# Patient Record
Sex: FEMALE | Race: BLACK OR AFRICAN AMERICAN | NOT HISPANIC OR LATINO | Employment: FULL TIME | URBAN - METROPOLITAN AREA
[De-identification: names, ages, dates, MRNs, and addresses within clinical notes are randomized per-mention and may not be internally consistent; named-entity substitution may affect disease eponyms.]

---

## 2023-06-12 LAB
ANION GAP IN SER/PLAS: 10 MMOL/L (ref 10–20)
CALCIUM (MG/DL) IN SER/PLAS: 9.5 MG/DL (ref 8.6–10.6)
CARBON DIOXIDE, TOTAL (MMOL/L) IN SER/PLAS: 24 MMOL/L (ref 21–32)
CHLORIDE (MMOL/L) IN SER/PLAS: 110 MMOL/L (ref 98–107)
CREATININE (MG/DL) IN SER/PLAS: 0.68 MG/DL (ref 0.5–1.05)
ESTIMATED AVERAGE GLUCOSE FOR HBA1C: 148 MG/DL
GFR FEMALE: >90 ML/MIN/1.73M2
GLUCOSE (MG/DL) IN SER/PLAS: 134 MG/DL (ref 74–99)
HEMOGLOBIN A1C/HEMOGLOBIN TOTAL IN BLOOD: 6.8 %
POTASSIUM (MMOL/L) IN SER/PLAS: 4 MMOL/L (ref 3.5–5.3)
SODIUM (MMOL/L) IN SER/PLAS: 140 MMOL/L (ref 136–145)
UREA NITROGEN (MG/DL) IN SER/PLAS: 7 MG/DL (ref 6–23)

## 2023-09-30 ENCOUNTER — PHARMACY VISIT (OUTPATIENT)
Dept: PHARMACY | Facility: CLINIC | Age: 53
End: 2023-09-30
Payer: MEDICAID

## 2023-09-30 PROCEDURE — RXMED WILLOW AMBULATORY MEDICATION CHARGE

## 2023-10-16 DIAGNOSIS — E11.9 TYPE 2 DIABETES MELLITUS WITHOUT COMPLICATION, WITH LONG-TERM CURRENT USE OF INSULIN (MULTI): Primary | ICD-10-CM

## 2023-10-16 DIAGNOSIS — Z79.4 TYPE 2 DIABETES MELLITUS WITHOUT COMPLICATION, WITH LONG-TERM CURRENT USE OF INSULIN (MULTI): Primary | ICD-10-CM

## 2023-10-16 RX ORDER — INSULIN LISPRO 100 [IU]/ML
INJECTION, SOLUTION INTRAVENOUS; SUBCUTANEOUS
COMMUNITY
End: 2023-12-11 | Stop reason: SDUPTHER

## 2023-10-16 RX ORDER — INSULIN GLARGINE 100 [IU]/ML
INJECTION, SOLUTION SUBCUTANEOUS
COMMUNITY
Start: 2020-08-21 | End: 2023-10-16 | Stop reason: SDUPTHER

## 2023-10-16 RX ORDER — INSULIN ASPART 100 [IU]/ML
INJECTION, SOLUTION INTRAVENOUS; SUBCUTANEOUS
COMMUNITY
Start: 2022-01-04 | End: 2023-10-16 | Stop reason: SDUPTHER

## 2023-10-17 ENCOUNTER — PHARMACY VISIT (OUTPATIENT)
Dept: PHARMACY | Facility: CLINIC | Age: 53
End: 2023-10-17
Payer: MEDICAID

## 2023-10-17 PROCEDURE — RXMED WILLOW AMBULATORY MEDICATION CHARGE

## 2023-10-17 RX ORDER — ROSUVASTATIN CALCIUM 20 MG/1
20 TABLET, COATED ORAL DAILY
Qty: 30 TABLET | Refills: 3 | Status: SHIPPED | OUTPATIENT
Start: 2023-10-17 | End: 2024-10-16

## 2023-10-17 RX ORDER — INSULIN ASPART 100 [IU]/ML
6 INJECTION, SOLUTION INTRAVENOUS; SUBCUTANEOUS
Qty: 15 ML | Refills: 0 | Status: SHIPPED | OUTPATIENT
Start: 2023-10-17 | End: 2023-12-11 | Stop reason: SDUPTHER

## 2023-10-17 RX ORDER — INSULIN GLARGINE 100 [IU]/ML
23 INJECTION, SOLUTION SUBCUTANEOUS NIGHTLY
Qty: 15 ML | Refills: 0 | Status: SHIPPED | OUTPATIENT
Start: 2023-10-17 | End: 2023-12-11 | Stop reason: SDUPTHER

## 2023-10-18 ENCOUNTER — PHARMACY VISIT (OUTPATIENT)
Dept: PHARMACY | Facility: CLINIC | Age: 53
End: 2023-10-18
Payer: MEDICAID

## 2023-10-18 PROCEDURE — RXMED WILLOW AMBULATORY MEDICATION CHARGE

## 2023-10-23 ENCOUNTER — PHARMACY VISIT (OUTPATIENT)
Dept: PHARMACY | Facility: CLINIC | Age: 53
End: 2023-10-23
Payer: MEDICAID

## 2023-10-23 PROCEDURE — RXMED WILLOW AMBULATORY MEDICATION CHARGE

## 2023-12-05 ENCOUNTER — PHARMACY VISIT (OUTPATIENT)
Dept: PHARMACY | Facility: CLINIC | Age: 53
End: 2023-12-05
Payer: MEDICAID

## 2023-12-05 PROCEDURE — RXMED WILLOW AMBULATORY MEDICATION CHARGE

## 2023-12-11 DIAGNOSIS — E11.9 TYPE 2 DIABETES MELLITUS WITHOUT COMPLICATION, WITH LONG-TERM CURRENT USE OF INSULIN (MULTI): ICD-10-CM

## 2023-12-11 DIAGNOSIS — Z79.4 TYPE 2 DIABETES MELLITUS WITHOUT COMPLICATION, WITH LONG-TERM CURRENT USE OF INSULIN (MULTI): ICD-10-CM

## 2023-12-12 PROCEDURE — RXMED WILLOW AMBULATORY MEDICATION CHARGE

## 2023-12-12 RX ORDER — INSULIN ASPART 100 [IU]/ML
6 INJECTION, SOLUTION INTRAVENOUS; SUBCUTANEOUS
Qty: 15 ML | Refills: 0 | Status: SHIPPED | OUTPATIENT
Start: 2023-12-12 | End: 2023-12-20 | Stop reason: SDUPTHER

## 2023-12-12 RX ORDER — INSULIN GLARGINE 100 [IU]/ML
23 INJECTION, SOLUTION SUBCUTANEOUS NIGHTLY
Qty: 15 ML | Refills: 0 | Status: SHIPPED | OUTPATIENT
Start: 2023-12-12

## 2023-12-12 RX ORDER — INSULIN LISPRO 100 [IU]/ML
INJECTION, SOLUTION INTRAVENOUS; SUBCUTANEOUS
Qty: 10 ML | Refills: 0 | Status: SHIPPED | OUTPATIENT
Start: 2023-12-12

## 2023-12-20 ENCOUNTER — OFFICE VISIT (OUTPATIENT)
Dept: PRIMARY CARE | Facility: CLINIC | Age: 53
End: 2023-12-20
Payer: COMMERCIAL

## 2023-12-20 VITALS
OXYGEN SATURATION: 98 % | TEMPERATURE: 98 F | BODY MASS INDEX: 34.32 KG/M2 | RESPIRATION RATE: 18 BRPM | WEIGHT: 206 LBS | HEART RATE: 74 BPM | SYSTOLIC BLOOD PRESSURE: 156 MMHG | DIASTOLIC BLOOD PRESSURE: 87 MMHG | HEIGHT: 65 IN

## 2023-12-20 DIAGNOSIS — J02.9 SORE THROAT: ICD-10-CM

## 2023-12-20 DIAGNOSIS — E11.9 TYPE 2 DIABETES MELLITUS WITHOUT COMPLICATION, WITH LONG-TERM CURRENT USE OF INSULIN (MULTI): ICD-10-CM

## 2023-12-20 DIAGNOSIS — E04.1 THYROID NODULE: Primary | ICD-10-CM

## 2023-12-20 DIAGNOSIS — Z79.4 TYPE 2 DIABETES MELLITUS WITHOUT COMPLICATION, WITH LONG-TERM CURRENT USE OF INSULIN (MULTI): ICD-10-CM

## 2023-12-20 LAB
ANION GAP SERPL CALC-SCNC: 9 MMOL/L (ref 10–20)
BUN SERPL-MCNC: 9 MG/DL (ref 6–23)
CALCIUM SERPL-MCNC: 9.5 MG/DL (ref 8.6–10.6)
CHLORIDE SERPL-SCNC: 108 MMOL/L (ref 98–107)
CO2 SERPL-SCNC: 26 MMOL/L (ref 21–32)
CREAT SERPL-MCNC: 0.67 MG/DL (ref 0.5–1.05)
EST. AVERAGE GLUCOSE BLD GHB EST-MCNC: 189 MG/DL
GFR SERPL CREATININE-BSD FRML MDRD: >90 ML/MIN/1.73M*2
GLUCOSE SERPL-MCNC: 150 MG/DL (ref 74–99)
HBA1C MFR BLD: 8.2 %
POC RAPID STREP: NEGATIVE
POTASSIUM SERPL-SCNC: 3.8 MMOL/L (ref 3.5–5.3)
SODIUM SERPL-SCNC: 139 MMOL/L (ref 136–145)

## 2023-12-20 PROCEDURE — 3079F DIAST BP 80-89 MM HG: CPT | Performed by: NURSE PRACTITIONER

## 2023-12-20 PROCEDURE — 99213 OFFICE O/P EST LOW 20 MIN: CPT | Performed by: NURSE PRACTITIONER

## 2023-12-20 PROCEDURE — 4010F ACE/ARB THERAPY RXD/TAKEN: CPT | Performed by: NURSE PRACTITIONER

## 2023-12-20 PROCEDURE — RXMED WILLOW AMBULATORY MEDICATION CHARGE

## 2023-12-20 PROCEDURE — 83036 HEMOGLOBIN GLYCOSYLATED A1C: CPT | Performed by: NURSE PRACTITIONER

## 2023-12-20 PROCEDURE — 36415 COLL VENOUS BLD VENIPUNCTURE: CPT | Performed by: NURSE PRACTITIONER

## 2023-12-20 PROCEDURE — 3052F HG A1C>EQUAL 8.0%<EQUAL 9.0%: CPT | Performed by: NURSE PRACTITIONER

## 2023-12-20 PROCEDURE — 80048 BASIC METABOLIC PNL TOTAL CA: CPT | Performed by: NURSE PRACTITIONER

## 2023-12-20 PROCEDURE — 87880 STREP A ASSAY W/OPTIC: CPT | Performed by: NURSE PRACTITIONER

## 2023-12-20 PROCEDURE — 3077F SYST BP >= 140 MM HG: CPT | Performed by: NURSE PRACTITIONER

## 2023-12-20 RX ORDER — INSULIN ASPART 100 [IU]/ML
6 INJECTION, SOLUTION INTRAVENOUS; SUBCUTANEOUS
Qty: 15 ML | Refills: 0 | Status: SHIPPED | OUTPATIENT
Start: 2023-12-20

## 2023-12-20 RX ORDER — DULAGLUTIDE 1.5 MG/.5ML
1.5 INJECTION, SOLUTION SUBCUTANEOUS
Qty: 2 ML | Refills: 11 | Status: SHIPPED | OUTPATIENT
Start: 2023-12-20

## 2023-12-20 ASSESSMENT — PAIN SCALES - GENERAL: PAINLEVEL: 0-NO PAIN

## 2023-12-20 NOTE — PATIENT INSTRUCTIONS
Thank you for coming in for your visit today!    Please follow up in 12 weeks or sooner if needed.     Call to schedule the thyroid ultrasound.     Today we completed blood work. We will contact you with any abnormalities from this testing.    START trulicity     Call 911 or go to the emergency room if you have pain in your chest, difficulty breathing, or other life threatening symptoms.

## 2023-12-20 NOTE — PROGRESS NOTES
"Subjective   Mireille Haynes is a 52 y.o. female who presents for neck swellen.  HPI    Sent refills on prescriptions but patient has not been taking them. Did not realize they were at the pharmacy.   Notes that she went to the endocrinologist about a year ago and raised concern for her thyroid. She also notes that just on Monday she noticed generalized neck swelling and some challenge with swallowing. Swallowing has since improved. Denies throat pain.     All systems reviewed. Review of systems negative except for noted positives in HPI    Objective     /87   Pulse 74   Temp 36.7 °C (98 °F)   Resp 18   Ht 1.651 m (5' 5\")   Wt 93.4 kg (206 lb)   SpO2 98%   BMI 34.28 kg/m²    Vital signs noted and reviewed.       Physical Exam  Constitutional:       Appearance: Normal appearance.   Neck:      Thyroid: Thyromegaly present.   Cardiovascular:      Rate and Rhythm: Normal rate and regular rhythm.   Pulmonary:      Effort: Pulmonary effort is normal. No respiratory distress.      Breath sounds: Normal breath sounds.   Skin:     General: Skin is warm and dry.   Neurological:      Mental Status: She is oriented to person, place, and time.   Psychiatric:         Mood and Affect: Mood normal.             Assessment/Plan   Problem List Items Addressed This Visit    None              "

## 2023-12-22 ENCOUNTER — PHARMACY VISIT (OUTPATIENT)
Dept: PHARMACY | Facility: CLINIC | Age: 53
End: 2023-12-22
Payer: MEDICAID

## 2023-12-22 PROCEDURE — RXMED WILLOW AMBULATORY MEDICATION CHARGE

## 2023-12-27 DIAGNOSIS — Z79.4 TYPE 2 DIABETES MELLITUS WITHOUT COMPLICATION, WITH LONG-TERM CURRENT USE OF INSULIN (MULTI): ICD-10-CM

## 2023-12-27 DIAGNOSIS — E11.9 TYPE 2 DIABETES MELLITUS WITHOUT COMPLICATION, WITH LONG-TERM CURRENT USE OF INSULIN (MULTI): ICD-10-CM

## 2023-12-27 RX ORDER — LIRAGLUTIDE 6 MG/ML
INJECTION SUBCUTANEOUS
COMMUNITY
Start: 2021-02-15 | End: 2023-12-27 | Stop reason: SDUPTHER

## 2023-12-28 PROCEDURE — RXMED WILLOW AMBULATORY MEDICATION CHARGE

## 2023-12-28 RX ORDER — LIRAGLUTIDE 6 MG/ML
1.8 INJECTION SUBCUTANEOUS
Qty: 9 ML | Refills: 3 | Status: SHIPPED | OUTPATIENT
Start: 2023-12-28 | End: 2024-03-20 | Stop reason: ALTCHOICE

## 2023-12-29 ENCOUNTER — PHARMACY VISIT (OUTPATIENT)
Dept: PHARMACY | Facility: CLINIC | Age: 53
End: 2023-12-29
Payer: MEDICAID

## 2024-01-19 ENCOUNTER — PHARMACY VISIT (OUTPATIENT)
Dept: PHARMACY | Facility: CLINIC | Age: 54
End: 2024-01-19
Payer: MEDICAID

## 2024-01-22 PROCEDURE — RXMED WILLOW AMBULATORY MEDICATION CHARGE

## 2024-02-21 ENCOUNTER — PHARMACY VISIT (OUTPATIENT)
Dept: PHARMACY | Facility: CLINIC | Age: 54
End: 2024-02-21
Payer: MEDICAID

## 2024-03-13 ENCOUNTER — HOSPITAL ENCOUNTER (OUTPATIENT)
Dept: RADIOLOGY | Facility: HOSPITAL | Age: 54
Discharge: HOME | End: 2024-03-13
Payer: COMMERCIAL

## 2024-03-13 VITALS — WEIGHT: 200 LBS | BODY MASS INDEX: 34.15 KG/M2 | HEIGHT: 64 IN

## 2024-03-13 DIAGNOSIS — Z12.39 ENCOUNTER FOR OTHER SCREENING FOR MALIGNANT NEOPLASM OF BREAST: ICD-10-CM

## 2024-03-13 DIAGNOSIS — E04.1 THYROID NODULE: ICD-10-CM

## 2024-03-13 PROCEDURE — 77063 BREAST TOMOSYNTHESIS BI: CPT | Mod: BILATERAL PROCEDURE | Performed by: RADIOLOGY

## 2024-03-13 PROCEDURE — 76536 US EXAM OF HEAD AND NECK: CPT | Performed by: STUDENT IN AN ORGANIZED HEALTH CARE EDUCATION/TRAINING PROGRAM

## 2024-03-13 PROCEDURE — 77067 SCR MAMMO BI INCL CAD: CPT | Mod: BILATERAL PROCEDURE | Performed by: RADIOLOGY

## 2024-03-13 PROCEDURE — 76536 US EXAM OF HEAD AND NECK: CPT

## 2024-03-13 PROCEDURE — 77067 SCR MAMMO BI INCL CAD: CPT

## 2024-03-20 ENCOUNTER — PHARMACY VISIT (OUTPATIENT)
Dept: PHARMACY | Facility: CLINIC | Age: 54
End: 2024-03-20
Payer: MEDICAID

## 2024-03-20 PROCEDURE — RXMED WILLOW AMBULATORY MEDICATION CHARGE

## 2024-05-16 ENCOUNTER — OFFICE VISIT (OUTPATIENT)
Dept: PRIMARY CARE | Facility: CLINIC | Age: 54
End: 2024-05-16
Payer: COMMERCIAL

## 2024-05-16 VITALS
OXYGEN SATURATION: 97 % | SYSTOLIC BLOOD PRESSURE: 138 MMHG | DIASTOLIC BLOOD PRESSURE: 83 MMHG | BODY MASS INDEX: 34.45 KG/M2 | TEMPERATURE: 98.3 F | HEIGHT: 64 IN | HEART RATE: 84 BPM | RESPIRATION RATE: 16 BRPM | WEIGHT: 201.8 LBS

## 2024-05-16 DIAGNOSIS — K21.9 GASTROESOPHAGEAL REFLUX DISEASE WITHOUT ESOPHAGITIS: ICD-10-CM

## 2024-05-16 DIAGNOSIS — M79.671 RIGHT FOOT PAIN: ICD-10-CM

## 2024-05-16 DIAGNOSIS — M54.12 CERVICAL RADICULOPATHY: Primary | ICD-10-CM

## 2024-05-16 DIAGNOSIS — E04.1 THYROID NODULE: ICD-10-CM

## 2024-05-16 DIAGNOSIS — Z79.4 TYPE 2 DIABETES MELLITUS WITHOUT COMPLICATION, WITH LONG-TERM CURRENT USE OF INSULIN (MULTI): ICD-10-CM

## 2024-05-16 DIAGNOSIS — E11.9 TYPE 2 DIABETES MELLITUS WITHOUT COMPLICATION, WITH LONG-TERM CURRENT USE OF INSULIN (MULTI): ICD-10-CM

## 2024-05-16 LAB
ANION GAP SERPL CALC-SCNC: 9 MMOL/L (ref 10–20)
BUN SERPL-MCNC: 6 MG/DL (ref 6–23)
CALCIUM SERPL-MCNC: 9 MG/DL (ref 8.6–10.6)
CHLORIDE SERPL-SCNC: 105 MMOL/L (ref 98–107)
CO2 SERPL-SCNC: 28 MMOL/L (ref 21–32)
CREAT SERPL-MCNC: 0.79 MG/DL (ref 0.5–1.05)
EGFRCR SERPLBLD CKD-EPI 2021: 90 ML/MIN/1.73M*2
EST. AVERAGE GLUCOSE BLD GHB EST-MCNC: 120 MG/DL
GLUCOSE SERPL-MCNC: 124 MG/DL (ref 74–99)
HBA1C MFR BLD: 5.8 %
POTASSIUM SERPL-SCNC: 3.7 MMOL/L (ref 3.5–5.3)
SODIUM SERPL-SCNC: 138 MMOL/L (ref 136–145)

## 2024-05-16 PROCEDURE — 3075F SYST BP GE 130 - 139MM HG: CPT | Performed by: NURSE PRACTITIONER

## 2024-05-16 PROCEDURE — RXMED WILLOW AMBULATORY MEDICATION CHARGE

## 2024-05-16 PROCEDURE — 80048 BASIC METABOLIC PNL TOTAL CA: CPT | Performed by: NURSE PRACTITIONER

## 2024-05-16 PROCEDURE — 36415 COLL VENOUS BLD VENIPUNCTURE: CPT | Performed by: NURSE PRACTITIONER

## 2024-05-16 PROCEDURE — 4010F ACE/ARB THERAPY RXD/TAKEN: CPT | Performed by: NURSE PRACTITIONER

## 2024-05-16 PROCEDURE — 83036 HEMOGLOBIN GLYCOSYLATED A1C: CPT | Performed by: NURSE PRACTITIONER

## 2024-05-16 PROCEDURE — 3079F DIAST BP 80-89 MM HG: CPT | Performed by: NURSE PRACTITIONER

## 2024-05-16 PROCEDURE — 3044F HG A1C LEVEL LT 7.0%: CPT | Performed by: NURSE PRACTITIONER

## 2024-05-16 PROCEDURE — 99214 OFFICE O/P EST MOD 30 MIN: CPT | Performed by: NURSE PRACTITIONER

## 2024-05-16 RX ORDER — BLOOD-GLUCOSE,RECEIVER,CONT
EACH MISCELLANEOUS
Qty: 1 EACH | Refills: 0 | Status: SHIPPED | OUTPATIENT
Start: 2024-05-16

## 2024-05-16 RX ORDER — BLOOD-GLUCOSE SENSOR
1 EACH MISCELLANEOUS CONTINUOUS
Qty: 3 EACH | Refills: 11 | Status: SHIPPED | OUTPATIENT
Start: 2024-05-16

## 2024-05-16 RX ORDER — OMEPRAZOLE 20 MG/1
20 CAPSULE, DELAYED RELEASE ORAL DAILY
Qty: 90 CAPSULE | Refills: 3 | Status: SHIPPED | OUTPATIENT
Start: 2024-05-16

## 2024-05-16 ASSESSMENT — PAIN SCALES - GENERAL: PAINLEVEL: 5

## 2024-05-16 ASSESSMENT — ENCOUNTER SYMPTOMS
DEPRESSION: 0
LOSS OF SENSATION IN FEET: 0
OCCASIONAL FEELINGS OF UNSTEADINESS: 0

## 2024-05-16 ASSESSMENT — PATIENT HEALTH QUESTIONNAIRE - PHQ9: 2. FEELING DOWN, DEPRESSED OR HOPELESS: NOT AT ALL

## 2024-05-16 NOTE — PATIENT INSTRUCTIONS
Thank you for coming in for your visit today!    Please follow up in 3-4 months for next A1C check.     Today we completed blood work. We will contact you with any abnormalities from this testing.    For your blood pressure:  Take your medication as directed. Try to take it around the same time daily.   Keep a log of your blood pressure. Be sure to bring it with you to your next appointment so we can review it together.  Adhere to the DASH diet. This includes decreasing your salt/sodium intake. Avoid canned foods, lunch meats, and frozen foods.  Exercise for 30 minutes daily.    Use the Dexcom monitor to check your sugar.   Continue to self monitor your blood sugar and take your medications, as directed.   Decrease refined sugars and carbohydrates in your diet.   DO NOT SKIP MEALS! It is important for your blood sugar to have small healthy meals throughout the day.  Make sure to keep a snack on your person at all times, if needed, for low blood sugar.  Exercise for 30 minutes daily.    Drink adequate liquids before, during, and after exercise to prevent dehydration, which can alter blood sugar levels.      Call 911 or go to the emergency room if you have pain in your chest, difficulty breathing, or other life threatening symptoms.

## 2024-05-16 NOTE — PROGRESS NOTES
"Subjective   Mireille Haynes is a 53 y.o. female who presents for No chief complaint on file..  HPI  Ms. Haynes is here today for follow up. Notes that she has not yet seen ENT for follow up on thyroid nodules. Will schedule today.     Has not been taking diabetic medications consistently.   Notes that she has been inconsistent with Trulicity but did recently take an injection.   She notes that she lost her glucometer and then has not been using her insulin.   Denies polydipsia, polyuria, headache,  or lightheadedness.   She will feel like her blood sugar is high and notice blurred vision    GERD symptoms more recently. Has not been taking medications. Notes specific triggers and that it worsens at nighttime. Denies vomiting.     Patient also notes pain in her upper back with numb and tingling sensations down her arms. Can worsen after sleeping or after moving throughout the day. Feels like it restricts her range of motion.     All systems reviewed. Review of systems negative except for noted positives in HPI    Objective     /83 (BP Location: Left arm, Patient Position: Sitting, BP Cuff Size: Large adult)   Pulse 84   Temp 36.8 °C (98.3 °F) (Skin)   Resp 16   Ht 1.626 m (5' 4\")   Wt 91.5 kg (201 lb 12.8 oz)   SpO2 97%   BMI 34.64 kg/m²    Vital signs noted and reviewed.       Physical Exam  Constitutional:       Appearance: Normal appearance.   Cardiovascular:      Rate and Rhythm: Normal rate and regular rhythm.   Pulmonary:      Effort: Pulmonary effort is normal. No respiratory distress.      Breath sounds: Normal breath sounds.   Skin:     General: Skin is warm and dry.   Neurological:      Mental Status: She is oriented to person, place, and time.   Psychiatric:         Mood and Affect: Mood normal.             Assessment/Plan   Problem List Items Addressed This Visit    None  Visit Diagnoses       Cervical radiculopathy    -  Primary    Relevant Orders    Referral to [facility]    Referral " to [facility]    Referral to Physical Therapy    Thyroid nodule        Relevant Orders    Referral to ENT    Type 2 diabetes mellitus without complication, with long-term current use of insulin (Multi)        Relevant Medications    blood-glucose sensor (Dexcom G7 Sensor) device    Dexcom G7  misc    Other Relevant Orders    Hemoglobin A1C (Completed)    Basic Metabolic Panel (Completed)    Gastroesophageal reflux disease without esophagitis        Relevant Medications    omeprazole (PriLOSEC) 20 mg DR capsule    Right foot pain        Relevant Orders    Referral to Podiatry

## 2024-05-17 ENCOUNTER — PHARMACY VISIT (OUTPATIENT)
Dept: PHARMACY | Facility: CLINIC | Age: 54
End: 2024-05-17
Payer: MEDICAID

## 2024-05-30 ENCOUNTER — APPOINTMENT (OUTPATIENT)
Dept: PODIATRY | Facility: CLINIC | Age: 54
End: 2024-05-30
Payer: COMMERCIAL

## 2024-06-11 ENCOUNTER — PHARMACY VISIT (OUTPATIENT)
Dept: PHARMACY | Facility: CLINIC | Age: 54
End: 2024-06-11
Payer: MEDICAID

## 2024-06-11 PROCEDURE — RXMED WILLOW AMBULATORY MEDICATION CHARGE

## 2024-07-17 ENCOUNTER — APPOINTMENT (OUTPATIENT)
Dept: OTOLARYNGOLOGY | Facility: CLINIC | Age: 54
End: 2024-07-17
Payer: COMMERCIAL

## 2024-07-29 PROCEDURE — RXMED WILLOW AMBULATORY MEDICATION CHARGE

## 2024-07-31 ENCOUNTER — PHARMACY VISIT (OUTPATIENT)
Dept: PHARMACY | Facility: CLINIC | Age: 54
End: 2024-07-31
Payer: MEDICAID

## 2024-08-27 ENCOUNTER — APPOINTMENT (OUTPATIENT)
Dept: PODIATRY | Facility: CLINIC | Age: 54
End: 2024-08-27
Payer: COMMERCIAL

## 2024-09-16 ENCOUNTER — PHARMACY VISIT (OUTPATIENT)
Dept: PHARMACY | Facility: CLINIC | Age: 54
End: 2024-09-16
Payer: MEDICAID

## 2024-09-16 PROCEDURE — RXMED WILLOW AMBULATORY MEDICATION CHARGE

## 2024-10-21 ENCOUNTER — PHARMACY VISIT (OUTPATIENT)
Dept: PHARMACY | Facility: CLINIC | Age: 54
End: 2024-10-21
Payer: MEDICAID

## 2024-10-21 PROCEDURE — RXMED WILLOW AMBULATORY MEDICATION CHARGE

## 2024-10-24 ENCOUNTER — APPOINTMENT (OUTPATIENT)
Dept: PODIATRY | Facility: CLINIC | Age: 54
End: 2024-10-24
Payer: COMMERCIAL

## 2024-11-12 PROCEDURE — RXMED WILLOW AMBULATORY MEDICATION CHARGE

## 2024-11-18 PROCEDURE — RXMED WILLOW AMBULATORY MEDICATION CHARGE

## 2024-11-22 ENCOUNTER — OFFICE VISIT (OUTPATIENT)
Dept: OBSTETRICS AND GYNECOLOGY | Facility: CLINIC | Age: 54
End: 2024-11-22
Payer: COMMERCIAL

## 2024-11-22 VITALS
SYSTOLIC BLOOD PRESSURE: 173 MMHG | WEIGHT: 197.3 LBS | BODY MASS INDEX: 33.87 KG/M2 | HEART RATE: 69 BPM | DIASTOLIC BLOOD PRESSURE: 92 MMHG

## 2024-11-22 DIAGNOSIS — I10 ESSENTIAL HYPERTENSION: ICD-10-CM

## 2024-11-22 DIAGNOSIS — Z11.3 SCREENING EXAMINATION FOR STI: ICD-10-CM

## 2024-11-22 DIAGNOSIS — Z30.013 ENCOUNTER FOR INITIAL PRESCRIPTION OF INJECTABLE CONTRACEPTIVE: Primary | ICD-10-CM

## 2024-11-22 PROCEDURE — 3077F SYST BP >= 140 MM HG: CPT | Performed by: STUDENT IN AN ORGANIZED HEALTH CARE EDUCATION/TRAINING PROGRAM

## 2024-11-22 PROCEDURE — 99214 OFFICE O/P EST MOD 30 MIN: CPT | Performed by: STUDENT IN AN ORGANIZED HEALTH CARE EDUCATION/TRAINING PROGRAM

## 2024-11-22 PROCEDURE — RXMED WILLOW AMBULATORY MEDICATION CHARGE

## 2024-11-22 PROCEDURE — 96372 THER/PROPH/DIAG INJ SC/IM: CPT | Performed by: STUDENT IN AN ORGANIZED HEALTH CARE EDUCATION/TRAINING PROGRAM

## 2024-11-22 PROCEDURE — 2500000004 HC RX 250 GENERAL PHARMACY W/ HCPCS (ALT 636 FOR OP/ED): Mod: SE | Performed by: STUDENT IN AN ORGANIZED HEALTH CARE EDUCATION/TRAINING PROGRAM

## 2024-11-22 PROCEDURE — 3080F DIAST BP >= 90 MM HG: CPT | Performed by: STUDENT IN AN ORGANIZED HEALTH CARE EDUCATION/TRAINING PROGRAM

## 2024-11-22 RX ORDER — MEDROXYPROGESTERONE ACETATE 150 MG/ML
150 INJECTION, SUSPENSION INTRAMUSCULAR
Status: SHIPPED | OUTPATIENT
Start: 2024-11-22 | End: 2025-11-17

## 2024-11-22 RX ORDER — LOSARTAN POTASSIUM 100 MG/1
TABLET ORAL
Qty: 60 TABLET | Refills: 0 | Status: SHIPPED | OUTPATIENT
Start: 2024-11-22 | End: 2025-11-22

## 2024-11-22 ASSESSMENT — ENCOUNTER SYMPTOMS
ENDOCRINE NEGATIVE: 0
EYES NEGATIVE: 0
RESPIRATORY NEGATIVE: 0
ALLERGIC/IMMUNOLOGIC NEGATIVE: 0
CONSTITUTIONAL NEGATIVE: 0
CARDIOVASCULAR NEGATIVE: 0
GASTROINTESTINAL NEGATIVE: 0
PSYCHIATRIC NEGATIVE: 0
HEMATOLOGIC/LYMPHATIC NEGATIVE: 0
MUSCULOSKELETAL NEGATIVE: 0
NEUROLOGICAL NEGATIVE: 0

## 2024-11-22 ASSESSMENT — PAIN SCALES - GENERAL: PAINLEVEL_OUTOF10: 7

## 2024-11-22 NOTE — PROGRESS NOTES
Patient here for Depo injection.  Last Annual: 11/22/24  RN discussed Depo-Provera side effects, calcium.  Depo given IM into right deltoid region. Depo supplied by office/Patient brought her own depo. Patient tolerated well.  Patient to RTC between 02/7-02/21 for depo.  Patient verbalized understanding and all questions were answered.

## 2024-11-22 NOTE — ASSESSMENT & PLAN NOTE
On record review, intermittently taking medication - asymptomatic and hypertensive today  60 day courtesy refill provided  Encouraged patient to schedule follow up with primary care, Dr. Wade - states she will reach out

## 2024-11-22 NOTE — PROGRESS NOTES
Subjective   Patient ID: Mireille Haynes is a 53 y.o. female who presents for Contraception (Pt here for depo restart /Lmp-11-10-24/Last pap-9-14-20/Std-all blood,urine/Chaperone-accepted).    Here for Depo for menstrual suppression. Has been using for years and is happy with it. Also desires STI testing.     BP on arrival elevated, patient reports she forgot to take her medication this morning and smoked a cigarette while rushing here. Denies HA, CP, SOB, vision changes.    Objective   Physical Exam  Constitutional:       General: She is not in acute distress.     Appearance: Normal appearance.   HENT:      Head: Normocephalic.   Cardiovascular:      Rate and Rhythm: Normal rate.   Pulmonary:      Effort: Pulmonary effort is normal. No respiratory distress.   Skin:     General: Skin is warm and dry.   Neurological:      Mental Status: She is alert and oriented to person, place, and time.   Psychiatric:         Mood and Affect: Mood normal.         Behavior: Behavior normal.         Thought Content: Thought content normal.         Judgment: Judgment normal.         Assessment/Plan   Problem List Items Addressed This Visit             ICD-10-CM    Encounter for initial prescription of injectable contraceptive - Primary Z30.013     Missed urine cup in bathroom today - unable to provide urine  Reviewed risks of depo on an unrecognized pregnancy, and discussed extremely low likelihood of spontaneous pregnancy at age 53. Through shared decision making, patient wishes to receive depo today.  1 year depo Rx ordered, administered  Pap UTD RTC annual         Relevant Medications    medroxyPROGESTERone (Depo-Provera) injection 150 mg (Start on 11/22/2024  3:45 PM)    Other Relevant Orders    POCT pregnancy, urine    Essential hypertension I10     On record review, intermittently taking medication - asymptomatic and hypertensive today  60 day courtesy refill provided  Encouraged patient to schedule follow up with primary care,  Dr. Wade - states she will reach out         Relevant Medications    losartan (Cozaar) 100 mg tablet     Other Visit Diagnoses         Codes    Screening examination for STI     Z11.3    Relevant Orders    Syphilis Screen with Reflex    HIV 1/2 Antigen/Antibody Screen with Reflex to Confirmation    Hepatitis C Antibody    Hepatitis B Surface Antigen    Trichomonas vaginalis, Amplified    C. trachomatis / N. gonorrhoeae, Amplified                 Aron Concepcion MD 11/22/24 3:27 PM

## 2024-11-22 NOTE — ASSESSMENT & PLAN NOTE
Missed urine cup in bathroom today - unable to provide urine  Reviewed risks of depo on an unrecognized pregnancy, and discussed extremely low likelihood of spontaneous pregnancy at age 53. Through shared decision making, patient wishes to receive depo today.  1 year depo Rx ordered, administered  Pap UTD RTC annual

## 2024-12-06 ENCOUNTER — PHARMACY VISIT (OUTPATIENT)
Dept: PHARMACY | Facility: CLINIC | Age: 54
End: 2024-12-06
Payer: MEDICAID

## 2024-12-27 DIAGNOSIS — E11.9 TYPE 2 DIABETES MELLITUS WITHOUT COMPLICATION, WITH LONG-TERM CURRENT USE OF INSULIN (MULTI): ICD-10-CM

## 2024-12-27 DIAGNOSIS — Z79.4 TYPE 2 DIABETES MELLITUS WITHOUT COMPLICATION, WITH LONG-TERM CURRENT USE OF INSULIN (MULTI): ICD-10-CM

## 2024-12-27 RX ORDER — DULAGLUTIDE 1.5 MG/.5ML
1.5 INJECTION, SOLUTION SUBCUTANEOUS
Qty: 2 ML | Refills: 11 | Status: CANCELLED | OUTPATIENT
Start: 2024-12-29

## 2024-12-30 DIAGNOSIS — Z79.4 TYPE 2 DIABETES MELLITUS WITHOUT COMPLICATION, WITH LONG-TERM CURRENT USE OF INSULIN (MULTI): ICD-10-CM

## 2024-12-30 DIAGNOSIS — E11.9 TYPE 2 DIABETES MELLITUS WITHOUT COMPLICATION, WITH LONG-TERM CURRENT USE OF INSULIN (MULTI): ICD-10-CM

## 2025-01-06 RX ORDER — DULAGLUTIDE 1.5 MG/.5ML
1.5 INJECTION, SOLUTION SUBCUTANEOUS
Qty: 2 ML | Refills: 11 | Status: SHIPPED | OUTPATIENT
Start: 2025-01-12

## 2025-02-21 ENCOUNTER — CLINICAL SUPPORT (OUTPATIENT)
Dept: OBSTETRICS AND GYNECOLOGY | Facility: CLINIC | Age: 55
End: 2025-02-21
Payer: COMMERCIAL

## 2025-02-21 DIAGNOSIS — Z30.42 DEPO-PROVERA CONTRACEPTIVE STATUS: Primary | ICD-10-CM

## 2025-02-21 PROCEDURE — 2500000004 HC RX 250 GENERAL PHARMACY W/ HCPCS (ALT 636 FOR OP/ED): Mod: SE | Performed by: STUDENT IN AN ORGANIZED HEALTH CARE EDUCATION/TRAINING PROGRAM

## 2025-02-21 PROCEDURE — 96372 THER/PROPH/DIAG INJ SC/IM: CPT | Performed by: STUDENT IN AN ORGANIZED HEALTH CARE EDUCATION/TRAINING PROGRAM

## 2025-02-21 RX ADMIN — MEDROXYPROGESTERONE ACETATE 150 MG: 150 INJECTION, SUSPENSION INTRAMUSCULAR at 08:42

## 2025-02-21 NOTE — PROGRESS NOTES
Pt here for depo provera.  Last injection 11/12/214      Depo provera  supplied by office.  Injection given, Patient tolerated well. Education provided.    Annual due 11/12/2025    RTC 5/9 5/30/25  Patient verbalized  understanding and questions answered.    Dicussed side effects discussed including irregular menstrual periods or none at all, need for calcium in diet to help bone density.

## 2025-04-03 ENCOUNTER — PHARMACY VISIT (OUTPATIENT)
Dept: PHARMACY | Facility: CLINIC | Age: 55
End: 2025-04-03
Payer: MEDICAID

## 2025-04-03 PROCEDURE — RXMED WILLOW AMBULATORY MEDICATION CHARGE

## 2025-04-24 ENCOUNTER — HOSPITAL ENCOUNTER (OUTPATIENT)
Dept: RADIOLOGY | Facility: CLINIC | Age: 55
Discharge: HOME | End: 2025-04-24
Payer: COMMERCIAL

## 2025-04-24 ENCOUNTER — OFFICE VISIT (OUTPATIENT)
Dept: PRIMARY CARE | Facility: CLINIC | Age: 55
End: 2025-04-24
Payer: COMMERCIAL

## 2025-04-24 VITALS
OXYGEN SATURATION: 99 % | TEMPERATURE: 97.3 F | DIASTOLIC BLOOD PRESSURE: 75 MMHG | WEIGHT: 198 LBS | HEART RATE: 85 BPM | BODY MASS INDEX: 33.8 KG/M2 | SYSTOLIC BLOOD PRESSURE: 121 MMHG | HEIGHT: 64 IN | RESPIRATION RATE: 16 BRPM

## 2025-04-24 DIAGNOSIS — M54.12 CERVICAL RADICULOPATHY: ICD-10-CM

## 2025-04-24 DIAGNOSIS — Z79.4 TYPE 2 DIABETES MELLITUS WITH HYPERGLYCEMIA, WITH LONG-TERM CURRENT USE OF INSULIN: ICD-10-CM

## 2025-04-24 DIAGNOSIS — Z79.4 TYPE 2 DIABETES MELLITUS WITHOUT COMPLICATION, WITH LONG-TERM CURRENT USE OF INSULIN: ICD-10-CM

## 2025-04-24 DIAGNOSIS — R51.9 INTRACTABLE HEADACHE, UNSPECIFIED CHRONICITY PATTERN, UNSPECIFIED HEADACHE TYPE: ICD-10-CM

## 2025-04-24 DIAGNOSIS — R35.89 POLYURIA: Primary | ICD-10-CM

## 2025-04-24 DIAGNOSIS — L02.92 BOIL: ICD-10-CM

## 2025-04-24 DIAGNOSIS — E11.65 TYPE 2 DIABETES MELLITUS WITH HYPERGLYCEMIA, WITH LONG-TERM CURRENT USE OF INSULIN: ICD-10-CM

## 2025-04-24 DIAGNOSIS — I10 ESSENTIAL HYPERTENSION: ICD-10-CM

## 2025-04-24 DIAGNOSIS — R63.1 INCREASED THIRST: ICD-10-CM

## 2025-04-24 DIAGNOSIS — E04.1 THYROID NODULE: ICD-10-CM

## 2025-04-24 DIAGNOSIS — E11.9 TYPE 2 DIABETES MELLITUS WITHOUT COMPLICATION, WITH LONG-TERM CURRENT USE OF INSULIN: ICD-10-CM

## 2025-04-24 DIAGNOSIS — N76.0 ACUTE VAGINITIS: ICD-10-CM

## 2025-04-24 LAB
POC APPEARANCE, URINE: CLEAR
POC BILIRUBIN, URINE: NEGATIVE
POC BLOOD, URINE: NEGATIVE
POC COLOR, URINE: YELLOW
POC GLUCOSE, URINE: NEGATIVE MG/DL
POC KETONES, URINE: NEGATIVE MG/DL
POC LEUKOCYTES, URINE: NEGATIVE
POC NITRITE,URINE: NEGATIVE
POC PH, URINE: 6.5 PH
POC PROTEIN, URINE: NEGATIVE MG/DL
POC SPECIFIC GRAVITY, URINE: >=1.03
POC UROBILINOGEN, URINE: 0.2 EU/DL

## 2025-04-24 PROCEDURE — 99215 OFFICE O/P EST HI 40 MIN: CPT | Performed by: NURSE PRACTITIONER

## 2025-04-24 PROCEDURE — 4010F ACE/ARB THERAPY RXD/TAKEN: CPT | Performed by: NURSE PRACTITIONER

## 2025-04-24 PROCEDURE — 3074F SYST BP LT 130 MM HG: CPT | Performed by: NURSE PRACTITIONER

## 2025-04-24 PROCEDURE — 36415 COLL VENOUS BLD VENIPUNCTURE: CPT | Performed by: NURSE PRACTITIONER

## 2025-04-24 PROCEDURE — 3008F BODY MASS INDEX DOCD: CPT | Performed by: NURSE PRACTITIONER

## 2025-04-24 PROCEDURE — 3078F DIAST BP <80 MM HG: CPT | Performed by: NURSE PRACTITIONER

## 2025-04-24 PROCEDURE — 72050 X-RAY EXAM NECK SPINE 4/5VWS: CPT

## 2025-04-24 PROCEDURE — RXMED WILLOW AMBULATORY MEDICATION CHARGE

## 2025-04-24 PROCEDURE — 81002 URINALYSIS NONAUTO W/O SCOPE: CPT | Performed by: NURSE PRACTITIONER

## 2025-04-24 RX ORDER — NAPROXEN 500 MG/1
500 TABLET ORAL 2 TIMES DAILY PRN
Qty: 60 TABLET | Refills: 0 | Status: SHIPPED | OUTPATIENT
Start: 2025-04-24 | End: 2025-07-23

## 2025-04-24 RX ORDER — BLOOD-GLUCOSE SENSOR
1 EACH MISCELLANEOUS CONTINUOUS
Qty: 3 EACH | Refills: 11 | Status: SHIPPED | OUTPATIENT
Start: 2025-04-24

## 2025-04-24 RX ORDER — FLUCONAZOLE 150 MG/1
150 TABLET ORAL ONCE
Qty: 1 TABLET | Refills: 0 | Status: SHIPPED | OUTPATIENT
Start: 2025-04-24 | End: 2025-04-26

## 2025-04-24 RX ORDER — ROSUVASTATIN CALCIUM 20 MG/1
20 TABLET, COATED ORAL DAILY
Qty: 90 TABLET | Refills: 1 | Status: SHIPPED | OUTPATIENT
Start: 2025-04-24 | End: 2026-04-24

## 2025-04-24 RX ORDER — LOSARTAN POTASSIUM 100 MG/1
100 TABLET ORAL DAILY
Qty: 90 TABLET | Refills: 1 | Status: SHIPPED | OUTPATIENT
Start: 2025-04-24

## 2025-04-24 RX ORDER — DOXYCYCLINE 100 MG/1
100 CAPSULE ORAL 2 TIMES DAILY
Qty: 14 CAPSULE | Refills: 0 | Status: SHIPPED | OUTPATIENT
Start: 2025-04-24 | End: 2025-05-02

## 2025-04-24 RX ORDER — DICLOFENAC SODIUM 10 MG/G
4 GEL TOPICAL 4 TIMES DAILY
Qty: 100 G | Refills: 1 | Status: SHIPPED | OUTPATIENT
Start: 2025-04-24

## 2025-04-24 RX ORDER — CYCLOBENZAPRINE HCL 5 MG
5 TABLET ORAL NIGHTLY PRN
Qty: 30 TABLET | Refills: 0 | Status: SHIPPED | OUTPATIENT
Start: 2025-04-24 | End: 2025-06-23

## 2025-04-24 ASSESSMENT — ENCOUNTER SYMPTOMS
NUMBNESS: 1
FREQUENCY: 1
POLYDIPSIA: 1
OCCASIONAL FEELINGS OF UNSTEADINESS: 0
WEAKNESS: 1
LOSS OF SENSATION IN FEET: 0
DEPRESSION: 0

## 2025-04-24 ASSESSMENT — PATIENT HEALTH QUESTIONNAIRE - PHQ9
1. LITTLE INTEREST OR PLEASURE IN DOING THINGS: NOT AT ALL
SUM OF ALL RESPONSES TO PHQ9 QUESTIONS 1 AND 2: 0
2. FEELING DOWN, DEPRESSED OR HOPELESS: NOT AT ALL

## 2025-04-24 ASSESSMENT — PAIN SCALES - GENERAL: PAINLEVEL_OUTOF10: 7

## 2025-04-24 NOTE — PROGRESS NOTES
"Subjective   Patient ID: Mireille Haynes is a 54 y.o. female who presents for Med Refill.    Med Refill  Associated symptoms include numbness (Right upper arm) and weakness.    Pt. Arrives to clinic today for chief complaint of unspecified Right arm pain 7/10.  Intermittent numbness after use, and numbness and weakness in right upper arm, and 4th and 5th fingers on right hand.  Reports dropping objects at home due to weakness.  States that she has had 2 episodes of falls at home a few months ago, denies LOC, or injury, states that \"ankles just gave out\".  States that she has NOT been monitoring blood glucose at home, and takes Novolog intermittently when she feels her blood sugar is very high, when vision is blurry.  Reports polydipsia, polyuria, and intermittent blurred vision, and has concern for yeast infection.  Pt. Also states that she has a boil under right breast for 3 days, she has taken OTC triple antibiotic ointment which has helped but is . Pt. States that she has did not go to ENT referral and that thyroid nodules are still present.  Smokes 6 cigarettes a day is interested in cessation, smokes Marijuana twice daily, no ETOH.    Review of Systems   Eyes:         Intermittent blurred vision   Endocrine: Positive for polydipsia and polyuria.   Genitourinary:  Positive for frequency.   Neurological:  Positive for weakness and numbness (Right upper arm).        Intermittent numbness in right upper arm, and weakness in right hand.       Objective   /75   Pulse 85   Temp 36.3 °C (97.3 °F)   Resp 16   Ht 1.626 m (5' 4\")   Wt 89.8 kg (198 lb)   SpO2 99%   BMI 33.99 kg/m²     Physical Exam  Cardiovascular:      Rate and Rhythm: Normal rate and regular rhythm.   Pulmonary:      Effort: Pulmonary effort is normal.      Breath sounds: Normal breath sounds.   Musculoskeletal:      Right upper arm: Tenderness present.      Left upper arm: Normal.      Right elbow: No tenderness.      Right hand: " Decreased strength. Normal sensation.      Left hand: Normal.      Comments: -tenderness elicited with Abduction of  right upper arm  - strength is 5/5 bilaterally, but 4/5 in 4th and 5th finger of right hand         Assessment/Plan   Diagnoses and all orders for this visit:  Polyuria  Increased thirst  Cervical radiculopathy  -     XR cervical spine complete 4-5 views; Future  -     cyclobenzaprine (Flexeril) 5 mg tablet; Take 1 tablet (5 mg) by mouth as needed at bedtime for muscle spasms.  -     naproxen (Naprosyn) 500 mg tablet; Take 1 tablet (500 mg) by mouth 2 times a day as needed for mild pain (1 - 3) (pain).  -     diclofenac sodium (Voltaren) 1 % gel; Apply 4.5 inches (4 g) topically 4 times a day.  Intractable headache, unspecified chronicity pattern, unspecified headache type  Type 2 diabetes mellitus with hyperglycemia, with long-term current use of insulin  -     Comprehensive metabolic panel  -     Hemoglobin A1C  -     CBC  -     POCT UA (nonautomated) manually resulted  Boil  -     doxycycline (Vibramycin) 100 mg capsule; Take 1 capsule (100 mg) by mouth 2 times a day for 7 days. Take with at least 8 ounces (large glass) of water, do not lie down for 30 minutes after  Acute vaginitis  -     fluconazole (Diflucan) 150 mg tablet; Take 1 tablet (150 mg) by mouth 1 time for 1 dose.  Thyroid nodule  -     Referral to ENT; Future  Type 2 diabetes mellitus without complication, with long-term current use of insulin  -     Referral to Ophthalmology; Future  -     Referral to Podiatry; Future  -     blood-glucose sensor (Dexcom G7 Sensor) device; 1 each continuously. Change sensor every 10 days as directed  -     dulaglutide (Trulicity) 3 mg/0.5 mL injection; Inject 3 mg under the skin 1 (one) time per week.  -     rosuvastatin (Crestor) 20 mg tablet; TAKE 1 TABLET BY MOUTH ONCE DAILY AS DIRECTED  Essential hypertension  -     losartan (Cozaar) 100 mg tablet; Take 1 tablet (100 mg) by mouth once  daily.       I was present with the NP student who participated in the documentation of this note. I have personally seen and examined the patient and performed the medical decision-making components. I have reviewed the student documentation and verified the findings in the note as written with additions or exceptions as stated in the body of the note     Krystal FOWLER CNP

## 2025-04-24 NOTE — PATIENT INSTRUCTIONS
Thank you for coming in for your visit today!    Please follow up in 3 weeks or sooner if needed.     Continue to self monitor your blood sugar and take your medications, as directed.   Decrease refined sugars and carbohydrates in your diet.   DO NOT SKIP MEALS! It is important for your blood sugar to have small healthy meals throughout the day.  Make sure to keep a snack on your person at all times, if needed, for low blood sugar.  Exercise for 30 minutes daily.    Drink adequate liquids before, during, and after exercise to prevent dehydration, which can alter blood sugar levels.    Today we completed blood work. We will contact you with any abnormalities from this testing.    Call to schedule follow up with ENT [phone]    Schedule with ophthalmology and podiatry.     Drink lots of water while taking the antibiotic.  Eat yogurt with active cultures or take a probiotic.   Take the medication with food.  Take ALL of this medication even if you are feeling better.     Take fluconazole when antibiotic is completed.       Call 151 or go to the emergency room if you have pain in your chest, difficulty breathing, or other life threatening symptoms.

## 2025-04-24 NOTE — PROGRESS NOTES
"Subjective   Mireille Haynes is a 54 y.o. female who presents for Med Refill.  HPI      All systems reviewed. Review of systems negative except for noted positives in HPI    Objective     /75   Pulse 85   Temp 36.3 °C (97.3 °F)   Resp 16   Ht 1.626 m (5' 4\")   Wt 89.8 kg (198 lb)   SpO2 99%   BMI 33.99 kg/m²    Vital signs noted and reviewed.       Physical Exam        Assessment/Plan   Problem List Items Addressed This Visit    None              "

## 2025-04-25 ENCOUNTER — PHARMACY VISIT (OUTPATIENT)
Dept: PHARMACY | Facility: CLINIC | Age: 55
End: 2025-04-25
Payer: MEDICAID

## 2025-04-26 LAB
ALBUMIN SERPL-MCNC: 4.4 G/DL (ref 3.6–5.1)
ALP SERPL-CCNC: 79 U/L (ref 37–153)
ALT SERPL-CCNC: 13 U/L (ref 6–29)
ANION GAP SERPL CALCULATED.4IONS-SCNC: 8 MMOL/L (CALC) (ref 7–17)
AST SERPL-CCNC: 13 U/L (ref 10–35)
BILIRUB SERPL-MCNC: 0.8 MG/DL (ref 0.2–1.2)
BUN SERPL-MCNC: 8 MG/DL (ref 7–25)
CALCIUM SERPL-MCNC: 9.3 MG/DL (ref 8.6–10.4)
CHLORIDE SERPL-SCNC: 102 MMOL/L (ref 98–110)
CO2 SERPL-SCNC: 22 MMOL/L (ref 20–32)
CREAT SERPL-MCNC: 0.72 MG/DL (ref 0.5–1.03)
EGFRCR SERPLBLD CKD-EPI 2021: 100 ML/MIN/1.73M2
ERYTHROCYTE [DISTWIDTH] IN BLOOD BY AUTOMATED COUNT: 13 % (ref 11–15)
EST. AVERAGE GLUCOSE BLD GHB EST-MCNC: 278 MG/DL
EST. AVERAGE GLUCOSE BLD GHB EST-SCNC: 15.4 MMOL/L
GLUCOSE SERPL-MCNC: 440 MG/DL (ref 65–99)
HBA1C MFR BLD: 11.3 %
HCT VFR BLD AUTO: 45.3 % (ref 35–45)
HGB BLD-MCNC: 14.5 G/DL (ref 11.7–15.5)
MCH RBC QN AUTO: 28.7 PG (ref 27–33)
MCHC RBC AUTO-ENTMCNC: 32 G/DL (ref 32–36)
MCV RBC AUTO: 89.7 FL (ref 80–100)
PLATELET # BLD AUTO: 138 THOUSAND/UL (ref 140–400)
PMV BLD REES-ECKER: 12.9 FL (ref 7.5–12.5)
POTASSIUM SERPL-SCNC: 4.4 MMOL/L (ref 3.5–5.3)
PROT SERPL-MCNC: 7.3 G/DL (ref 6.1–8.1)
RBC # BLD AUTO: 5.05 MILLION/UL (ref 3.8–5.1)
SODIUM SERPL-SCNC: 132 MMOL/L (ref 135–146)
WBC # BLD AUTO: 9.3 THOUSAND/UL (ref 3.8–10.8)

## 2025-04-28 PROBLEM — E04.1 THYROID NODULE: Status: ACTIVE | Noted: 2025-04-28

## 2025-04-28 PROBLEM — Z30.013 ENCOUNTER FOR INITIAL PRESCRIPTION OF INJECTABLE CONTRACEPTIVE: Status: RESOLVED | Noted: 2024-11-22 | Resolved: 2025-04-28

## 2025-04-28 PROBLEM — E04.1 THYROID NODULE: Status: RESOLVED | Noted: 2025-04-28 | Resolved: 2025-04-28

## 2025-04-28 PROBLEM — I10 ESSENTIAL HYPERTENSION: Status: RESOLVED | Noted: 2024-11-22 | Resolved: 2025-04-28

## 2025-04-28 PROCEDURE — RXMED WILLOW AMBULATORY MEDICATION CHARGE

## 2025-05-02 ENCOUNTER — PHARMACY VISIT (OUTPATIENT)
Dept: PHARMACY | Facility: CLINIC | Age: 55
End: 2025-05-02
Payer: MEDICAID

## 2025-05-09 ENCOUNTER — CLINICAL SUPPORT (OUTPATIENT)
Dept: OBSTETRICS AND GYNECOLOGY | Facility: CLINIC | Age: 55
End: 2025-05-09
Payer: COMMERCIAL

## 2025-05-09 PROCEDURE — 96372 THER/PROPH/DIAG INJ SC/IM: CPT | Performed by: STUDENT IN AN ORGANIZED HEALTH CARE EDUCATION/TRAINING PROGRAM

## 2025-05-09 PROCEDURE — 2500000004 HC RX 250 GENERAL PHARMACY W/ HCPCS (ALT 636 FOR OP/ED): Mod: JZ,SE | Performed by: STUDENT IN AN ORGANIZED HEALTH CARE EDUCATION/TRAINING PROGRAM

## 2025-05-09 RX ADMIN — MEDROXYPROGESTERONE ACETATE 150 MG: 150 INJECTION, SUSPENSION INTRAMUSCULAR at 08:46

## 2025-05-09 NOTE — PROGRESS NOTES
Pt here for depo provera.  Pt is on time      Depo provera  supplied by office.  Injection given r gluteal   Patient tolerated well. Education provided.    Annual due  11/2025    RTC 7/25 8-15/25  Patient verbalized  understanding and questions answered.    Dicussed side effects discussed including irregular menstrual periods or none at all, need for calcium in diet to help bone density.

## 2025-05-12 ENCOUNTER — TELEPHONE (OUTPATIENT)
Dept: OTOLARYNGOLOGY | Facility: HOSPITAL | Age: 55
End: 2025-05-12
Payer: COMMERCIAL

## 2025-05-12 NOTE — TELEPHONE ENCOUNTER
Ms. Haynes was a no show for her scheduled new patient apt with Dr. Barron.  I called her listed phone number and left a message RE: missed apt & how to reschedule.

## 2025-05-15 ENCOUNTER — OFFICE VISIT (OUTPATIENT)
Dept: PRIMARY CARE | Facility: CLINIC | Age: 55
End: 2025-05-15
Payer: COMMERCIAL

## 2025-05-15 VITALS
RESPIRATION RATE: 16 BRPM | OXYGEN SATURATION: 99 % | SYSTOLIC BLOOD PRESSURE: 136 MMHG | WEIGHT: 199 LBS | HEIGHT: 64 IN | HEART RATE: 80 BPM | DIASTOLIC BLOOD PRESSURE: 76 MMHG | TEMPERATURE: 98 F | BODY MASS INDEX: 33.97 KG/M2

## 2025-05-15 DIAGNOSIS — M54.12 CERVICAL RADICULOPATHY: Primary | ICD-10-CM

## 2025-05-15 DIAGNOSIS — N76.0 ACUTE VAGINITIS: ICD-10-CM

## 2025-05-15 DIAGNOSIS — Z79.4 TYPE 2 DIABETES MELLITUS WITH HYPERGLYCEMIA, WITH LONG-TERM CURRENT USE OF INSULIN: ICD-10-CM

## 2025-05-15 DIAGNOSIS — E11.9 TYPE 2 DIABETES MELLITUS WITHOUT COMPLICATION, WITH LONG-TERM CURRENT USE OF INSULIN: ICD-10-CM

## 2025-05-15 DIAGNOSIS — Z79.4 TYPE 2 DIABETES MELLITUS WITHOUT COMPLICATION, WITH LONG-TERM CURRENT USE OF INSULIN: ICD-10-CM

## 2025-05-15 DIAGNOSIS — E11.65 TYPE 2 DIABETES MELLITUS WITH HYPERGLYCEMIA, WITH LONG-TERM CURRENT USE OF INSULIN: ICD-10-CM

## 2025-05-15 DIAGNOSIS — B37.31 VAGINAL YEAST INFECTION: ICD-10-CM

## 2025-05-15 LAB
POC APPEARANCE, URINE: CLEAR
POC BILIRUBIN, URINE: NEGATIVE
POC BLOOD, URINE: NEGATIVE
POC GLUCOSE, URINE: NEGATIVE MG/DL
POC KETONES, URINE: NEGATIVE MG/DL
POC LEUKOCYTES, URINE: NEGATIVE
POC NITRITE,URINE: NEGATIVE
POC PH, URINE: 7 PH
POC PROTEIN, URINE: NEGATIVE MG/DL
POC SPECIFIC GRAVITY, URINE: 1.01
POC UROBILINOGEN, URINE: 0.2 EU/DL

## 2025-05-15 PROCEDURE — 4010F ACE/ARB THERAPY RXD/TAKEN: CPT | Performed by: NURSE PRACTITIONER

## 2025-05-15 PROCEDURE — 99214 OFFICE O/P EST MOD 30 MIN: CPT | Performed by: NURSE PRACTITIONER

## 2025-05-15 PROCEDURE — 3078F DIAST BP <80 MM HG: CPT | Performed by: NURSE PRACTITIONER

## 2025-05-15 PROCEDURE — 81002 URINALYSIS NONAUTO W/O SCOPE: CPT | Performed by: NURSE PRACTITIONER

## 2025-05-15 PROCEDURE — 3075F SYST BP GE 130 - 139MM HG: CPT | Performed by: NURSE PRACTITIONER

## 2025-05-15 PROCEDURE — 3008F BODY MASS INDEX DOCD: CPT | Performed by: NURSE PRACTITIONER

## 2025-05-15 RX ORDER — LANCETS
EACH MISCELLANEOUS
Qty: 100 EACH | Refills: 3 | Status: SHIPPED | OUTPATIENT
Start: 2025-05-15

## 2025-05-15 RX ORDER — BLOOD-GLUCOSE SENSOR
1 EACH MISCELLANEOUS CONTINUOUS
Qty: 3 EACH | Refills: 11 | Status: SHIPPED | OUTPATIENT
Start: 2025-05-15

## 2025-05-15 ASSESSMENT — ENCOUNTER SYMPTOMS
OCCASIONAL FEELINGS OF UNSTEADINESS: 0
DEPRESSION: 0
POLYDIPSIA: 0
DYSURIA: 0
ABDOMINAL PAIN: 0
FEVER: 0
POLYPHAGIA: 0
CHILLS: 0
LOSS OF SENSATION IN FEET: 0
NUMBNESS: 1
WEAKNESS: 1
SHORTNESS OF BREATH: 0

## 2025-05-15 ASSESSMENT — PAIN SCALES - GENERAL: PAINLEVEL_OUTOF10: 4

## 2025-05-15 NOTE — PATIENT INSTRUCTIONS
Thank you for coming in for your visit today!    Please follow up at the end of July for diabetic follow up.     Call to get rescheduled with Dr. Barron [phone]    Continue to self monitor your blood sugar and take your medications, as directed.   Decrease refined sugars and carbohydrates in your diet.   DO NOT SKIP MEALS! It is important for your blood sugar to have small healthy meals throughout the day.  Make sure to keep a snack on your person at all times, if needed, for low blood sugar.  Exercise for 30 minutes daily.    Drink adequate liquids before, during, and after exercise to prevent dehydration, which can alter blood sugar levels.      Call 911 or go to the emergency room if you have pain in your chest, difficulty breathing, or other life threatening symptoms.

## 2025-05-15 NOTE — PROGRESS NOTES
"Subjective   Patient ID: Mireille Haynes is a 54 y.o. female who presents for Follow-up.    Pt is a 53 yo F with PMH of HTN, DM2 with hyperglycemia and long-term insulin use, cervical radiculopathy and acute vaginitis presents today for a 3 week follow up. Her last A1c 3 weeks ago was 11.3, was started Trulicity and CGM. She tried the CGM but did not like its position on her arm, so discontinued use. She states that her appetite and urinary frequency have decreased since last visit. She endorses headaches and blurred vision, plans to schedule appointment with eye specialist.     Complains of vaginal discomfort/discharge for the past few days. Was treated at last visit with fluconazole for acute vaginitis and symptoms resolved within one week. Now experiencing similar symptoms with cloudy urine. Denies abdominal pain, fever, chills, or urinary urgency.    Complains of continued arm numbness and weakness. XR obtained, showed mild C5-C6 radiculopathy. She has been taking Flexeril on the days that she works with some symptom improvement and has taken Naproxen \"a couple of times\".    Her BP is within goal range. She does not have a BP cuff to check at home. Endorses smoking 7-10 cigarettes per day, smoking marijuana 1-2 per day and drinking alcohol socially.        Review of Systems   Constitutional:  Negative for chills and fever.   Eyes:         Blurry vision     Respiratory:  Negative for shortness of breath.    Cardiovascular:  Negative for chest pain.   Gastrointestinal:  Negative for abdominal pain.   Endocrine: Negative for polydipsia, polyphagia and polyuria.   Genitourinary:  Positive for vaginal discharge (White, curd-like). Negative for dysuria.        Vaginal discomfort   Neurological:  Positive for weakness and numbness.       Objective   /76   Pulse 80   Temp 36.7 °C (98 °F)   Resp 16   Ht 1.626 m (5' 4\")   Wt 90.3 kg (199 lb)   SpO2 99%   BMI 34.16 kg/m²     Physical Exam  Constitutional:       " Appearance: She is not ill-appearing.   Cardiovascular:      Rate and Rhythm: Normal rate and regular rhythm.      Pulses: Normal pulses.      Heart sounds: Normal heart sounds.   Pulmonary:      Effort: Pulmonary effort is normal.      Breath sounds: Normal breath sounds.   Genitourinary:     Comments: Deferred  Skin:     General: Skin is warm and dry.   Neurological:      Mental Status: She is alert.         Assessment/Plan   Diagnoses and all orders for this visit:  Cervical radiculopathy  Type 2 diabetes mellitus with hyperglycemia, with long-term current use of insulin  -     POCT UA (nonautomated) manually resulted  -     lancets misc; Use to monitor blood glucose  Acute vaginitis  Vaginal yeast infection  Type 2 diabetes mellitus without complication, with long-term current use of insulin  -     blood-glucose sensor (Dexcom G7 Sensor) device; 1 each continuously. Change sensor every 10 days as directed      Follow up in 6 weeks or sooner if needed.    YAO Arteaga    I was present with the student who participated in the documentation of this note. I have personally seen and examined the patient and performed the medical decision-making components. I have reviewed the student documentation and verified the findings in the note as written with additions or exceptions as stated in the body of the note     Krystal FOWLER, CNP

## 2025-05-16 DIAGNOSIS — B37.31 VAGINAL YEAST INFECTION: Primary | ICD-10-CM

## 2025-05-16 RX ORDER — FLUCONAZOLE 150 MG/1
150 TABLET ORAL ONCE
Qty: 1 TABLET | Refills: 0 | Status: SHIPPED | OUTPATIENT
Start: 2025-05-16 | End: 2025-05-17

## 2025-05-27 ENCOUNTER — OFFICE VISIT (OUTPATIENT)
Dept: OTOLARYNGOLOGY | Facility: HOSPITAL | Age: 55
End: 2025-05-27
Payer: COMMERCIAL

## 2025-05-27 VITALS — WEIGHT: 196 LBS | TEMPERATURE: 98.1 F | HEIGHT: 64 IN | BODY MASS INDEX: 33.46 KG/M2

## 2025-05-27 DIAGNOSIS — E04.2 NONTOXIC MULTINODULAR GOITER: Primary | ICD-10-CM

## 2025-05-27 DIAGNOSIS — E04.1 THYROID NODULE: ICD-10-CM

## 2025-05-27 PROCEDURE — 3008F BODY MASS INDEX DOCD: CPT | Performed by: STUDENT IN AN ORGANIZED HEALTH CARE EDUCATION/TRAINING PROGRAM

## 2025-05-27 PROCEDURE — 99204 OFFICE O/P NEW MOD 45 MIN: CPT | Performed by: STUDENT IN AN ORGANIZED HEALTH CARE EDUCATION/TRAINING PROGRAM

## 2025-05-27 PROCEDURE — 99214 OFFICE O/P EST MOD 30 MIN: CPT | Performed by: STUDENT IN AN ORGANIZED HEALTH CARE EDUCATION/TRAINING PROGRAM

## 2025-05-27 ASSESSMENT — PATIENT HEALTH QUESTIONNAIRE - PHQ9
SUM OF ALL RESPONSES TO PHQ9 QUESTIONS 1 & 2: 0
1. LITTLE INTEREST OR PLEASURE IN DOING THINGS: NOT AT ALL
2. FEELING DOWN, DEPRESSED OR HOPELESS: NOT AT ALL

## 2025-05-27 ASSESSMENT — PAIN SCALES - GENERAL: PAINLEVEL_OUTOF10: 0-NO PAIN

## 2025-05-27 NOTE — PROGRESS NOTES
ENT Outpatient Consultation    Chief Complaint: thyroid nodules  History Of Present Illness  Mireille Haynes is a 54 y.o. female presents for evaluation of thyroid nodules    Right sided neck swelling x less than 5 yrs   Has undergone thyroid ultrasounds for the last few yrs.   Endorses dysphagia  No voice changes   No breathing concerns       Past Medical History  Medical History[1]    Surgical History  Surgical History[2]     Social History  She reports that she has been smoking cigarettes. She has never used smokeless tobacco. She reports current drug use. Drug: Marijuana. She reports that she does not drink alcohol.    Family History  Family History[3]     Allergies  Patient has no known allergies.     Physical Exam:  CONSTITUTIONAL:  No acute distress  VOICE:  No hoarseness or other abnormality  RESPIRATION:  Breathing comfortably, no stridor  CV:  No clubbing/cyanosis/edema in hands  EYES:  EOM intact, sclera normal  NEURO:  Alert and oriented times 3, Cranial nerves II-XII grossly intact and symmetric bilaterally  HEAD AND FACE:  Symmetric facial features, no masses or lesion  SALIVARY GLANDS:  Parotid and submandibular glands normal bilaterally  EARS:  Normal external ears, external auditory canals, and TMs to otoscopy, normal hearing to conversational voice.  NOSE:  External nose midline, anterior rhinoscopy is normal with limited visualization to the anterior aspect of the interior turbinates, no bleeding or drainage, no lesions  ORAL CAVITY/OROPHARYNX/LIPS:  Normal mucous membranes, normal floor of mouth/tongue/OP, no masses or lesions  PHARYNGEAL WALLS:  No masses or lesions  NECK/LYMPH:  No LAD, +thyromegaly   SKIN:  Neck skin is without scar or injury  PSYCH:  Alert and oriented with appropriate mood and affect     Last Recorded Vitals  There were no vitals taken for this visit.    Relevant Results  I personally reviewed the notes by the referring provider     Other relevant:   4/24/25 HgA1C  11.3    Imaging   Thyroid US 3/13/24  FINDINGS:  RIGHT LOBE:  The right lobe of the thyroid measures 3.8 cm x 2.2 cm x 6.5 cm. The  right lobe of the thyroid is heterogenous in echotexture and  re-demonstrates multiple nodules.      NODULE 1  Within the mid thyroid lobe there is a nodule with the following  features: Size: 2.9 x 1.6 x 2.4 cm previously measuring 2.9 x 1.8 x  2.8 cm remaining similar in size and appearance. ** previously biopsied as benign **       Composition: Solid or almost completely solid (2)  Echogenicity: Hyperechoic or isoechoic (1)  Shape: Wider-than-tall (0)  Margin: Smooth (0)  Echogenic Foci: None or Large comet-tail artifacs (0)      The total score of this nodule is 3 corresponding to a TI-RADS  category 3; Mildly suspicious. FNA is recommended if >2.5cm, Follow  up if >1.5cm in 1, 3, and 5 years..      NODULE 2      Within the upper thyroid lobe there is a nodule with the following  features: Size: 2.1 x 1.3 x 1.8 cm previously measuring 2.1 x 1.1 x  1.3 cm.      Composition: Solid or almost completely solid (2)  Echogenicity: Hyperechoic or isoechoic (1)  Shape: Wider-than-tall (0)  Margin: Smooth (0)  Echogenic Foci: None or Large comet-tail artifacs (0)      The total score of this nodule is 3 corresponding to a TI-RADS  category 3; Mildly suspicious. FNA is recommended if >2.5cm, Follow  up if >1.5cm in 1, 3, and 5 years..      NODULE 3      Within the mid thyroid lobe there is a nodule with the following  features: Size: 2.6 x 1.3 x 2 cm previously measuring 1.8 x 1 x 2.1  cm.      Composition: Solid or almost completely solid (2)  Echogenicity: Hyperechoic or isoechoic (1)  Shape: Wider-than-tall (0)  Margin: Smooth (0)  Echogenic Foci: None or Large comet-tail artifacs (0)      The total score of this nodule is 3 corresponding to a TI-RADS  category 3; Mildly suspicious. FNA is recommended if >2.5cm, Follow  up if >1.5cm in 1, 3, and 5 years..         LEFT LOBE:  The left lobe  of the thyroid measures 2.5 cm x 2.4 cm x 5.2 cm. The  left lobe of the thyroid is homogeneous in echotexture and  demonstrates a single nodule.      An ill-defined hypoechoic lesion in the inferior posterior aspect  thyroid lobe which abuts the thyroid capsule and does not appear to  represent a discrete thyroid nodule and was partially visualized on  prior ultrasound from 05/09/2023 and measures 2.6 x 1.4 x 1.3 cm.  This lesion may represent exophytic thyroid nodule with the following  characteristics: Within the mid thyroid lobe there is a nodule with  the following features:      Composition: Solid or almost completely solid (2)  Echogenicity: Hypoechoic (2)  Shape: Wider-than-tall (0)  Margin: Ill-defined (0)  Echogenic Foci: None or Large comet-tail artifacs (0)      The total score of this nodule is 4 corresponding to a TI-RADS  category 4; Moderately suspicious. FNA is recommended if >1.5cm,  Follow up if >1.0cm in 1, 2, 3, and 5 years..      ISTHMUS:  The isthmus measures approximately 0.6 cm and is homogeneous in  echotexture and without any identifiable nodules.      CERVICAL LYMPH NODES:  A few non enlarged cervical lymph nodes are present with normal  appearance.      IMPRESSION:  1. A 2.6 cm ill-defined nodule in the inferior posterior aspect  abutting the left thyroid lobe. Findings were partially visualized on  prior ultrasound and may represent a TI-RADS 4 exophytic thyroid  nodule for which FNA is recommended. However given appearance and  indeterminate location, CT of the neck can be obtained for further  evaluation.  2. Slight interval increase in size of a 2.6 cm mid right thyroid  lobe TI-RADS 3 nodule compared to 05/09/2022 ultrasound. FNA is  recommended, if not previously performed  3. Stable size and appearance of a 2.9 cm TI-RADS 3 inferior right  thyroid nodule compared to 05/09/2022 ultrasound. FNA is recommended,  if not previously performed.             Component  Ref Range & Units 2  yr ago  (23) 2 yr ago  (22) 3 yr ago  (3/21/22) 4 yr ago  (2/15/21)   TSH  0.44 - 3.98 mIU/L 1.03 0.75 CM 0.68 CM 0.97           Assessment and Plan  54 y.o. female with right> left multinodular goiter: Multiple TIRADS 3 Nodules on Right  ( 2.9cm- previously biopsied as benign and unchanged, 2.1 cm -table from , 2.6cm- increased in size from ), and a 2.6 cm TIRADS 4 nodule (significant incr in size from ) on the Left meeting criteria for FNA.   We discussed repeat biopsies of new/enlarging nodules vs proceeding with surgical intervention. She wishes to proceed first with biopsy for surgical planning. IR guided biopsy ordered. Will follow up with these results to determine next steps. Thyroid fxn wnl.     Ideally she will work with her PCP to improve blood sugar control as her A1c is 11.3 as of 2025 and would be best to be <8 to undergo elective surgery.       Chela Mcdermott MD         [1]   Past Medical History:  Diagnosis Date    Diabetes mellitus (Multi) 2014    Impression:  Type II DM, only on metformin 500 BID  Per pt has been diabetic ~ 4-5 months  No HbA1c in our system  Blood sugars elevated per pt at home ~ past 2 weeks  HbA1C 14  S/p Diabetes education / teaching     Plan:  - carb controlled diet  - Lantus 21 qhs and prandial 10 /10 /10 upon discharge  - Pt instructed to f/u within 1 week with endo and pcp as an outpt.      Essential hypertension 2024    Hx of trichomoniasis     Other benign neoplasm of skin of other parts of face 2021    Dermoid cyst of forehead    Syphilis     Thyroid nodule 2025   [2]   Past Surgical History:  Procedure Laterality Date     SECTION, LOW TRANSVERSE  2019    US GUIDED THYROID BIOPSY  2021    US GUIDED THYROID BIOPSY 2021 [facility] [place]   [3] No family history on file.

## 2025-05-27 NOTE — LETTER
June 5, 2025     Krystal Wade, APRN-CNP  [address]  [facility] [address]  [facility] [address]    Patient: Mireille Haynes   YOB: 1970   Date of Visit: 5/27/2025       Dear Dr. Krystal Wade, APRN-CNP:    Thank you for referring Mireille Haynes to me for evaluation. Below are my notes for this consultation.  If you have questions, please do not hesitate to call me. I look forward to following your patient along with you.       Sincerely,     Chela Mcdermott MD      CC: No Recipients  ______________________________________________________________________________________    ENT Outpatient Consultation    Chief Complaint: thyroid nodules  History Of Present Illness  Mireille Haynes is a 54 y.o. female presents for evaluation of thyroid nodules    Right sided neck swelling x less than 5 yrs   Has undergone thyroid ultrasounds for the last few yrs.   Endorses dysphagia  No voice changes   No breathing concerns       Past Medical History  Medical History[1]    Surgical History  Surgical History[2]     Social History  She reports that she has been smoking cigarettes. She has never used smokeless tobacco. She reports current drug use. Drug: Marijuana. She reports that she does not drink alcohol.    Family History  Family History[3]     Allergies  Patient has no known allergies.     Physical Exam:  CONSTITUTIONAL:  No acute distress  VOICE:  No hoarseness or other abnormality  RESPIRATION:  Breathing comfortably, no stridor  CV:  No clubbing/cyanosis/edema in hands  EYES:  EOM intact, sclera normal  NEURO:  Alert and oriented times 3, Cranial nerves II-XII grossly intact and symmetric bilaterally  HEAD AND FACE:  Symmetric facial features, no masses or lesion  SALIVARY GLANDS:  Parotid and submandibular glands normal bilaterally  EARS:  Normal external ears, external auditory canals, and TMs to otoscopy, normal hearing to conversational voice.  NOSE:  External nose midline, anterior rhinoscopy is normal with limited  visualization to the anterior aspect of the interior turbinates, no bleeding or drainage, no lesions  ORAL CAVITY/OROPHARYNX/LIPS:  Normal mucous membranes, normal floor of mouth/tongue/OP, no masses or lesions  PHARYNGEAL WALLS:  No masses or lesions  NECK/LYMPH:  No LAD, +thyromegaly   SKIN:  Neck skin is without scar or injury  PSYCH:  Alert and oriented with appropriate mood and affect     Last Recorded Vitals  There were no vitals taken for this visit.    Relevant Results  I personally reviewed the notes by the referring provider     Other relevant:   4/24/25 HgA1C 11.3    Imaging   Thyroid US 3/13/24  FINDINGS:  RIGHT LOBE:  The right lobe of the thyroid measures 3.8 cm x 2.2 cm x 6.5 cm. The  right lobe of the thyroid is heterogenous in echotexture and  re-demonstrates multiple nodules.      NODULE 1  Within the mid thyroid lobe there is a nodule with the following  features: Size: 2.9 x 1.6 x 2.4 cm previously measuring 2.9 x 1.8 x  2.8 cm remaining similar in size and appearance. ** previously biopsied as benign **       Composition: Solid or almost completely solid (2)  Echogenicity: Hyperechoic or isoechoic (1)  Shape: Wider-than-tall (0)  Margin: Smooth (0)  Echogenic Foci: None or Large comet-tail artifacs (0)      The total score of this nodule is 3 corresponding to a TI-RADS  category 3; Mildly suspicious. FNA is recommended if >2.5cm, Follow  up if >1.5cm in 1, 3, and 5 years..      NODULE 2      Within the upper thyroid lobe there is a nodule with the following  features: Size: 2.1 x 1.3 x 1.8 cm previously measuring 2.1 x 1.1 x  1.3 cm.      Composition: Solid or almost completely solid (2)  Echogenicity: Hyperechoic or isoechoic (1)  Shape: Wider-than-tall (0)  Margin: Smooth (0)  Echogenic Foci: None or Large comet-tail artifacs (0)      The total score of this nodule is 3 corresponding to a TI-RADS  category 3; Mildly suspicious. FNA is recommended if >2.5cm, Follow  up if >1.5cm in 1, 3, and 5  years..      NODULE 3      Within the mid thyroid lobe there is a nodule with the following  features: Size: 2.6 x 1.3 x 2 cm previously measuring 1.8 x 1 x 2.1  cm.      Composition: Solid or almost completely solid (2)  Echogenicity: Hyperechoic or isoechoic (1)  Shape: Wider-than-tall (0)  Margin: Smooth (0)  Echogenic Foci: None or Large comet-tail artifacs (0)      The total score of this nodule is 3 corresponding to a TI-RADS  category 3; Mildly suspicious. FNA is recommended if >2.5cm, Follow  up if >1.5cm in 1, 3, and 5 years..         LEFT LOBE:  The left lobe of the thyroid measures 2.5 cm x 2.4 cm x 5.2 cm. The  left lobe of the thyroid is homogeneous in echotexture and  demonstrates a single nodule.      An ill-defined hypoechoic lesion in the inferior posterior aspect  thyroid lobe which abuts the thyroid capsule and does not appear to  represent a discrete thyroid nodule and was partially visualized on  prior ultrasound from 05/09/2023 and measures 2.6 x 1.4 x 1.3 cm.  This lesion may represent exophytic thyroid nodule with the following  characteristics: Within the mid thyroid lobe there is a nodule with  the following features:      Composition: Solid or almost completely solid (2)  Echogenicity: Hypoechoic (2)  Shape: Wider-than-tall (0)  Margin: Ill-defined (0)  Echogenic Foci: None or Large comet-tail artifacs (0)      The total score of this nodule is 4 corresponding to a TI-RADS  category 4; Moderately suspicious. FNA is recommended if >1.5cm,  Follow up if >1.0cm in 1, 2, 3, and 5 years..      ISTHMUS:  The isthmus measures approximately 0.6 cm and is homogeneous in  echotexture and without any identifiable nodules.      CERVICAL LYMPH NODES:  A few non enlarged cervical lymph nodes are present with normal  appearance.      IMPRESSION:  1. A 2.6 cm ill-defined nodule in the inferior posterior aspect  abutting the left thyroid lobe. Findings were partially visualized on  prior ultrasound and may  represent a TI-RADS 4 exophytic thyroid  nodule for which FNA is recommended. However given appearance and  indeterminate location, CT of the neck can be obtained for further  evaluation.  2. Slight interval increase in size of a 2.6 cm mid right thyroid  lobe TI-RADS 3 nodule compared to 05/09/2022 ultrasound. FNA is  recommended, if not previously performed  3. Stable size and appearance of a 2.9 cm TI-RADS 3 inferior right  thyroid nodule compared to 05/09/2022 ultrasound. FNA is recommended,  if not previously performed.             Component  Ref Range & Units 2 yr ago  (2/6/23) 2 yr ago  (9/22/22) 3 yr ago  (3/21/22) 4 yr ago  (2/15/21)   TSH  0.44 - 3.98 mIU/L 1.03 0.75 CM 0.68 CM 0.97           Assessment and Plan  54 y.o. female with right> left multinodular goiter: Multiple TIRADS 3 Nodules on Right  ( 2.9cm- previously biopsied as benign and unchanged, 2.1 cm -table from 2022, 2.6cm- increased in size from 2022), and a 2.6 cm TIRADS 4 nodule (significant incr in size from 2022) on the Left meeting criteria for FNA.   We discussed repeat biopsies of new/enlarging nodules vs proceeding with surgical intervention. She wishes to proceed first with biopsy for surgical planning. IR guided biopsy ordered. Will follow up with these results to determine next steps. Thyroid fxn wnl.     Ideally she will work with her PCP to improve blood sugar control as her A1c is 11.3 as of 4/2025 and would be best to be <8 to undergo elective surgery.       Chela Mcdermott MD         [1]  Past Medical History:  Diagnosis Date   • Diabetes mellitus (Multi) 08/07/2014    Impression:  Type II DM, only on metformin 500 BID  Per pt has been diabetic ~ 4-5 months  No HbA1c in our system  Blood sugars elevated per pt at home ~ past 2 weeks  HbA1C 14  S/p Diabetes education / teaching     Plan:  - carb controlled diet  - Lantus 21 qhs and prandial 10 /10 /10 upon discharge  - Pt instructed to f/u within 1 week with endo and pcp as an  outpt.     • Essential hypertension 2024   • Hx of trichomoniasis    • Other benign neoplasm of skin of other parts of face 2021    Dermoid cyst of forehead   • Syphilis    • Thyroid nodule 2025   [2]  Past Surgical History:  Procedure Laterality Date   •  SECTION, LOW TRANSVERSE  2019   • US GUIDED THYROID BIOPSY  2021    US GUIDED THYROID BIOPSY 2021 [facility] [place] [facility]   [3]  No family history on file.

## 2025-06-04 ENCOUNTER — PHARMACY VISIT (OUTPATIENT)
Dept: PHARMACY | Facility: CLINIC | Age: 55
End: 2025-06-04
Payer: MEDICAID

## 2025-06-04 PROCEDURE — RXMED WILLOW AMBULATORY MEDICATION CHARGE

## 2025-06-25 DIAGNOSIS — K21.9 GASTROESOPHAGEAL REFLUX DISEASE WITHOUT ESOPHAGITIS: ICD-10-CM

## 2025-06-25 RX ORDER — OMEPRAZOLE 20 MG/1
20 CAPSULE, DELAYED RELEASE ORAL DAILY
Qty: 90 CAPSULE | Refills: 0 | Status: SHIPPED | OUTPATIENT
Start: 2025-06-25

## 2025-06-30 ENCOUNTER — PHARMACY VISIT (OUTPATIENT)
Dept: PHARMACY | Facility: CLINIC | Age: 55
End: 2025-06-30
Payer: MEDICAID

## 2025-06-30 PROCEDURE — RXMED WILLOW AMBULATORY MEDICATION CHARGE

## 2025-07-11 PROCEDURE — RXMED WILLOW AMBULATORY MEDICATION CHARGE

## 2025-07-14 ENCOUNTER — PHARMACY VISIT (OUTPATIENT)
Dept: PHARMACY | Facility: CLINIC | Age: 55
End: 2025-07-14
Payer: MEDICAID

## 2025-07-31 ENCOUNTER — PHARMACY VISIT (OUTPATIENT)
Dept: PHARMACY | Facility: CLINIC | Age: 55
End: 2025-07-31
Payer: MEDICAID

## 2025-07-31 ENCOUNTER — APPOINTMENT (OUTPATIENT)
Dept: OBSTETRICS AND GYNECOLOGY | Facility: CLINIC | Age: 55
End: 2025-07-31
Payer: COMMERCIAL

## 2025-07-31 DIAGNOSIS — Z30.42 DEPO-PROVERA CONTRACEPTIVE STATUS: ICD-10-CM

## 2025-07-31 PROCEDURE — RXMED WILLOW AMBULATORY MEDICATION CHARGE

## 2025-07-31 PROCEDURE — 96372 THER/PROPH/DIAG INJ SC/IM: CPT | Performed by: STUDENT IN AN ORGANIZED HEALTH CARE EDUCATION/TRAINING PROGRAM

## 2025-07-31 PROCEDURE — 2500000004 HC RX 250 GENERAL PHARMACY W/ HCPCS (ALT 636 FOR OP/ED): Mod: JZ,SE | Performed by: STUDENT IN AN ORGANIZED HEALTH CARE EDUCATION/TRAINING PROGRAM

## 2025-07-31 RX ADMIN — MEDROXYPROGESTERONE ACETATE 150 MG: 150 INJECTION, SUSPENSION INTRAMUSCULAR at 15:31

## 2025-07-31 NOTE — PROGRESS NOTES
Patient here for Depo Provera injection  Patient 6 rights reviewed Right Patient Confirmed patient name and   Expiration of medication 2027  LMP: unknown  Last Depo Provera: 2025  Last Annual: 2024 - already scheduled for 2025 with Dr Concepcion  Depo Provera given IM into right gluteal region   Depo Provera given from:  office supplied  Patient tolerated well.  Patient to return for next Depo Provera injection 10/16/2025  Educated patient on condom use to prevent STDs   Patient verbalized understanding and denies any further questions or concerns

## 2025-08-04 ENCOUNTER — APPOINTMENT (OUTPATIENT)
Dept: OPHTHALMOLOGY | Facility: CLINIC | Age: 55
End: 2025-08-04
Payer: COMMERCIAL

## 2025-08-04 DIAGNOSIS — E11.9 TYPE 2 DIABETES MELLITUS WITHOUT COMPLICATION, UNSPECIFIED WHETHER LONG TERM INSULIN USE: ICD-10-CM

## 2025-08-04 DIAGNOSIS — H52.12 MYOPIA, LEFT EYE: ICD-10-CM

## 2025-08-04 DIAGNOSIS — H52.203 ASTIGMATISM OF BOTH EYES WITH PRESBYOPIA: Primary | ICD-10-CM

## 2025-08-04 DIAGNOSIS — H52.4 ASTIGMATISM OF BOTH EYES WITH PRESBYOPIA: Primary | ICD-10-CM

## 2025-08-04 PROCEDURE — 92015 DETERMINE REFRACTIVE STATE: CPT | Performed by: OPTOMETRIST

## 2025-08-04 PROCEDURE — 92004 COMPRE OPH EXAM NEW PT 1/>: CPT | Performed by: OPTOMETRIST

## 2025-08-04 ASSESSMENT — REFRACTION_MANIFEST
OD_CYLINDER: -0.50
OS_CYLINDER: -0.75
OD_AXIS: 080
OS_AXIS: 075
METHOD_AUTOREFRACTION: 1
OS_CYLINDER: -0.50
OS_SPHERE: -0.25
OD_CYLINDER: -0.50
OS_AXIS: 075
OD_SPHERE: PLANO
OD_AXIS: 075
OS_SPHERE: -0.50
OD_SPHERE: PLANO

## 2025-08-04 ASSESSMENT — CONF VISUAL FIELD
OS_SUPERIOR_NASAL_RESTRICTION: 0
OD_SUPERIOR_TEMPORAL_RESTRICTION: 0
OD_NORMAL: 1
OS_NORMAL: 1
OD_INFERIOR_TEMPORAL_RESTRICTION: 0
OD_SUPERIOR_NASAL_RESTRICTION: 0
OS_SUPERIOR_TEMPORAL_RESTRICTION: 0
OD_INFERIOR_NASAL_RESTRICTION: 0
OS_INFERIOR_TEMPORAL_RESTRICTION: 0
OS_INFERIOR_NASAL_RESTRICTION: 0

## 2025-08-04 ASSESSMENT — SLIT LAMP EXAM - LIDS: COMMENTS: GOOD POSITION

## 2025-08-04 ASSESSMENT — VISUAL ACUITY
OD_SC+: -2
OS_SC+: -1
OD_SC: 20/20
OS_SC: 20/30
METHOD: SNELLEN - LINEAR

## 2025-08-04 ASSESSMENT — TONOMETRY
IOP_METHOD: GOLDMANN APPLANATION
OS_IOP_MMHG: 15
OD_IOP_MMHG: 17

## 2025-08-04 ASSESSMENT — EXTERNAL EXAM - RIGHT EYE: OD_EXAM: NORMAL

## 2025-08-04 ASSESSMENT — CUP TO DISC RATIO
OS_RATIO: .3
OD_RATIO: .3

## 2025-08-04 ASSESSMENT — EXTERNAL EXAM - LEFT EYE: OS_EXAM: NORMAL

## 2025-08-04 NOTE — ASSESSMENT & PLAN NOTE
No diabetic retinopathy OU. No macular edema. A1C 11.3%.  Pt educated on findings and importance of maintaining a low A1C and FBS to prevent vision loss from diabetic retinopathy. Continue care with PCP as directed. Monitor annually with dilated fundus examination. Pt voiced understanding.

## 2025-08-04 NOTE — PROGRESS NOTES
Assessment/Plan   Problem List Items Addressed This Visit          Eye/Vision problems    Type 2 diabetes mellitus without complication    No diabetic retinopathy OU. No macular edema. A1C 11.3%.  Pt educated on findings and importance of maintaining a low A1C and FBS to prevent vision loss from diabetic retinopathy. Continue care with PCP as directed. Monitor annually with dilated fundus examination. Pt voiced understanding.           Myopia, left eye    See astigmatism/presbyopia.         Astigmatism of both eyes with presbyopia - Primary    Mild Rx. Not currently using any glasses.  Pt educated on findings. Release Rx for full time wear. Discussed adaptation. Discussed option for bifocals vs progressive lenses. Monitor annually. Pt voiced understanding.

## 2025-08-04 NOTE — ASSESSMENT & PLAN NOTE
Mild Rx. Not currently using any glasses.  Pt educated on findings. Release Rx for full time wear. Discussed adaptation. Discussed option for bifocals vs progressive lenses. Monitor annually. Pt voiced understanding.

## 2025-08-06 ENCOUNTER — OFFICE VISIT (OUTPATIENT)
Dept: PODIATRY | Facility: CLINIC | Age: 55
End: 2025-08-06
Payer: COMMERCIAL

## 2025-08-06 ENCOUNTER — HOSPITAL ENCOUNTER (OUTPATIENT)
Dept: RADIOLOGY | Facility: CLINIC | Age: 55
Discharge: HOME | End: 2025-08-06
Payer: COMMERCIAL

## 2025-08-06 DIAGNOSIS — M79.671 FOOT PAIN, BILATERAL: ICD-10-CM

## 2025-08-06 DIAGNOSIS — M20.11 HALLUX VALGUS, RIGHT: ICD-10-CM

## 2025-08-06 DIAGNOSIS — E11.9 ENCOUNTER FOR DIABETIC FOOT EXAM (MULTI): ICD-10-CM

## 2025-08-06 DIAGNOSIS — Z79.4 TYPE 2 DIABETES MELLITUS WITHOUT COMPLICATION, WITH LONG-TERM CURRENT USE OF INSULIN: Primary | ICD-10-CM

## 2025-08-06 DIAGNOSIS — M20.21 HALLUX RIGIDUS OF RIGHT FOOT: ICD-10-CM

## 2025-08-06 DIAGNOSIS — M79.672 FOOT PAIN, BILATERAL: ICD-10-CM

## 2025-08-06 DIAGNOSIS — Z72.0 TOBACCO USE: ICD-10-CM

## 2025-08-06 DIAGNOSIS — E11.9 TYPE 2 DIABETES MELLITUS WITHOUT COMPLICATION, WITH LONG-TERM CURRENT USE OF INSULIN: Primary | ICD-10-CM

## 2025-08-06 PROCEDURE — 99244 OFF/OP CNSLTJ NEW/EST MOD 40: CPT | Performed by: PODIATRIST

## 2025-08-06 PROCEDURE — 73630 X-RAY EXAM OF FOOT: CPT | Mod: BILATERAL PROCEDURE | Performed by: RADIOLOGY

## 2025-08-06 PROCEDURE — 99406 BEHAV CHNG SMOKING 3-10 MIN: CPT | Performed by: PODIATRIST

## 2025-08-06 PROCEDURE — 99214 OFFICE O/P EST MOD 30 MIN: CPT | Mod: 25 | Performed by: PODIATRIST

## 2025-08-06 PROCEDURE — 73630 X-RAY EXAM OF FOOT: CPT | Mod: 50

## 2025-08-06 PROCEDURE — 4010F ACE/ARB THERAPY RXD/TAKEN: CPT | Performed by: PODIATRIST

## 2025-08-06 ASSESSMENT — ENCOUNTER SYMPTOMS
LOSS OF SENSATION IN FEET: 0
DEPRESSION: 0
OCCASIONAL FEELINGS OF UNSTEADINESS: 0

## 2025-08-06 NOTE — PROGRESS NOTES
Chief Complaint   Patient presents with    Foot Problem     TIPS OF TOES HURT, R FOOT HAS A CALLUS ON IT BY BIG TOE.     New diabetic patient evaluation.  Referred by primary care physician    Chief complaint of pain first MTP joint right foot.  This been a progressive ongoing problem for her.  Painful in shoe gear.  Denies that she noted any swelling.  She also admits to decreased range of motion.  Rare occasional discomfort in the left foot.  Patient has longstanding history of diabetes.  About 10 years or so.  She has also had both hallux nails removed a few years ago.  She wonders about nail replacement.  Works as a [employer].  Smokes daily less than 1/2 pack.    General/Constitutional: Alert. NAD.   Respiratory: Non labored breathing.   Psychiatric: Mood and affect normal/baseline.   HEENT: Sclera clear. Wearing corrective lenses.  Dermatologic: Both great toe nailbeds are dry with some xerotic tissue.  No acute inflammation noted.  No palpable pain.  Nail plates are absent.  The other nails are mildly dystrophic and no acute inflammatory infectious process. Web spaces are dry. No ulcers no pre-ulcerative areas.  Vascular: Pedal pulses are intact and symmetric including the dorsalis pedis and the posterior tibial pulses. Feet are warm to touch. No swelling appreciated either extremity.  Neurological: Alert and oriented. No acute distress. No sensory impairment bilateral.  Monofilament testing is normal.  Gross sensation intact.  Musculoskeletal: Strength is normal for age. No acute deficits appreciated.  Hallux rigidus appreciated right great toe joint.  No crepitation identified.  X-rays reviewed by myself demonstrate degenerative joint changes first MTP joint right foot.  There is also hallux valgus deformity appreciated joint space narrowing and exostoses noted.  Much more rectus alignment in normal joint space on the left foot.    54-year-old diabetic female with hallux rigidus right foot and nail  pathology.    -Today's treatment and course of therapy was discussed with the patient in detail. Patient's questions were answered. Proper foot care was discussed. This dictation was done using Dragon computer software and as such may contain grammatical errors.  Patient is understanding current course of therapy and findings.    -Review x-rays in detail.     -Discussed diabetic footcare.  Information given on the same.    -Discussed smoking cessation greater than 3 minutes.    -I did discuss surgical intervention of the right foot.  Referred her to surgical podiatry.  Conservative management to consist of wider with shoes.  Avoid high heels.    -Referral to Dr. Azevedo for further evaluation management.

## 2026-08-05 ENCOUNTER — APPOINTMENT (OUTPATIENT)
Dept: OPHTHALMOLOGY | Facility: CLINIC | Age: 56
End: 2026-08-05
Payer: COMMERCIAL